# Patient Record
(demographics unavailable — no encounter records)

---

## 2024-10-09 NOTE — HISTORY OF PRESENT ILLNESS
[FreeTextEntry1] : 58 yo female here for HIV f/u consult  taking Dovato daily with no missed doses or SE denies any c/o at this time   Pt has appt with cardiology on 10/14/2024 for hx of cardiac murmur    From previous consult 7/30/2024 :  58 yo female with hx of HIV here for GYN - cervical and breast cancer screening denies any vaginal or breast c/o at this time  taking Dovato daily with no missed doses or SE    From previous consult 4/8/2024 : 58 yo female here for HIV f/u consult and results discussion taking Dovato daily with no missed doses or SE denies any c/o at this time    From previous consult 3/25/2024 ; 57 yo female here for HIV initial consult/ transfer of care taking Dovato daily with no missed doses or SE Dx 2009 - unsure of method of contagion was following with another ID provider but he retired and she moved. she has been on Truvada, Norvir, and Prezista but was changed to Dovato to decrease pill burden.  noted with left heel pain started 2-3 weeks ago worse in AM  PMH : HTN, abnormal anal pap- ASCUS, breast cysts - benign. denies any recent travel, hospitalizations, blood transfusions. PCP : none Vaccines : COVID Moderna x5 doses. Influenza vaccine 10/2023. Screenings : Last pap 2022. Last mammo 6/2023- breast cysts. needs colonoscopy PSH : left ankle sx PFH : Mother - HTN Social hx : denies any IVDU, smoking, Occasional ETOH, denies any tatoos Sexual hx : Heterosexual. Currently sexually active with usual partner. confirms condom use. denies any hx of STI Partner : Male. Negative HIV. denies any PrEP use. Occupation :  Lives with : Partner Who is aware of HIV : daughter, partner  NKDA    10/7/2024 Plan HIV 1. continue current treatment - refills given  2. do blood work  3. f/u 6 months  4. Influenza and Pneumococcal 20 vaccines given today   Murmur 1. f/u with cardiology as scheduled   Eczema  noted with mild eczema  1. restart Hydrocortisone cream BID

## 2024-10-09 NOTE — PHYSICAL EXAM
[General Appearance - Alert] : alert [General Appearance - In No Acute Distress] : in no acute distress [Sclera] : the sclera and conjunctiva were normal [PERRL With Normal Accommodation] : pupils were equal in size, round, reactive to light [Extraocular Movements] : extraocular movements were intact [Outer Ear] : the ears and nose were normal in appearance [Hearing Threshold Finger Rub Not [place]] : hearing was normal [Examination Of The Oral Cavity] : the lips and gums were normal [Both Tympanic Membranes Were Examined] : both tympanic membranes were normal [Oropharynx] : the oropharynx was normal with no thrush [Neck Appearance] : the appearance of the neck was normal [Neck Cervical Mass (___cm)] : no neck mass was observed [Jugular Venous Distention Increased] : there was no jugular-venous distention [Respiration, Rhythm And Depth] : normal respiratory rhythm and effort [Exaggerated Use Of Accessory Muscles For Inspiration] : no accessory muscle use [Auscultation Breath Sounds / Voice Sounds] : lungs were clear to auscultation bilaterally [Heart Rate And Rhythm] : heart rate was normal and rhythm regular [Heart Sounds] : normal S1 and S2 [Heart Sounds Gallop] : no gallops [Heart Sounds Pericardial Friction Rub] : no pericardial rub [Bowel Sounds] : normal bowel sounds [Abdomen Soft] : soft [Abdomen Tenderness] : non-tender [Costovertebral Angle Tenderness] : no CVA tenderness [No Palpable Adenopathy] : no palpable adenopathy [Musculoskeletal - Swelling] : no joint swelling [Range of Motion to Joints] : range of motion to joints [Nail Clubbing] : no clubbing  or cyanosis of the fingernails [Motor Tone] : muscle strength and tone were normal [Skin Color & Pigmentation] : normal skin color and pigmentation [] : no rash [Skin Lesions] : no skin lesions [FreeTextEntry1] : mild eczema [Cranial Nerves] : cranial nerves 2-12 were intact [Sensation] : the sensory exam was normal to light touch and pinprick [Motor Exam] : the motor exam was normal [No Focal Deficits] : no focal deficits [Oriented To Time, Place, And Person] : oriented to person, place, and time [Affect] : the affect was normal

## 2024-10-09 NOTE — HISTORY OF PRESENT ILLNESS
[FreeTextEntry1] : 58 yo female here for HIV f/u consult  taking Dovato daily with no missed doses or SE denies any c/o at this time   Pt has appt with cardiology on 10/14/2024 for hx of cardiac murmur    From previous consult 7/30/2024 :  58 yo female with hx of HIV here for GYN - cervical and breast cancer screening denies any vaginal or breast c/o at this time  taking Dovato daily with no missed doses or SE    From previous consult 4/8/2024 : 58 yo female here for HIV f/u consult and results discussion taking Dovato daily with no missed doses or SE denies any c/o at this time    From previous consult 3/25/2024 ; 59 yo female here for HIV initial consult/ transfer of care taking Dovato daily with no missed doses or SE Dx 2009 - unsure of method of contagion was following with another ID provider but he retired and she moved. she has been on Truvada, Norvir, and Prezista but was changed to Dovato to decrease pill burden.  noted with left heel pain started 2-3 weeks ago worse in AM  PMH : HTN, abnormal anal pap- ASCUS, breast cysts - benign. denies any recent travel, hospitalizations, blood transfusions. PCP : none Vaccines : COVID Moderna x5 doses. Influenza vaccine 10/2023. Screenings : Last pap 2022. Last mammo 6/2023- breast cysts. needs colonoscopy PSH : left ankle sx PFH : Mother - HTN Social hx : denies any IVDU, smoking, Occasional ETOH, denies any tatoos Sexual hx : Heterosexual. Currently sexually active with usual partner. confirms condom use. denies any hx of STI Partner : Male. Negative HIV. denies any PrEP use. Occupation :  Lives with : Partner Who is aware of HIV : daughter, partner  NKDA    10/7/2024 Plan HIV 1. continue current treatment - refills given  2. do blood work  3. f/u 6 months  4. Influenza and Pneumococcal 20 vaccines given today   Murmur 1. f/u with cardiology as scheduled   Eczema  noted with mild eczema  1. restart Hydrocortisone cream BID

## 2024-10-09 NOTE — ASSESSMENT
[FreeTextEntry1] : HIV f/u consult.  HIV stable   10/7/2024 Plan HIV 1. continue current treatment - refills given  2. do blood work  3. f/u 6 months  4. Influenza and Pneumococcal 20 vaccines given today   Murmur 1. f/u with cardiology as scheduled   Eczema  noted with mild eczema  1. restart Hydrocortisone cream BID  [Treatment Education] : treatment education [Treatment Adherence] : treatment adherence [Medical Care Issues] : medical care issues

## 2024-10-09 NOTE — PHYSICAL EXAM
[General Appearance - Alert] : alert [General Appearance - In No Acute Distress] : in no acute distress [Sclera] : the sclera and conjunctiva were normal [PERRL With Normal Accommodation] : pupils were equal in size, round, reactive to light [Extraocular Movements] : extraocular movements were intact [Outer Ear] : the ears and nose were normal in appearance [Hearing Threshold Finger Rub Not [place]] : hearing was normal [Examination Of The Oral Cavity] : the lips and gums were normal [Both Tympanic Membranes Were Examined] : both tympanic membranes were normal [Oropharynx] : the oropharynx was normal with no thrush [Neck Appearance] : the appearance of the neck was normal [Neck Cervical Mass (___cm)] : no neck mass was observed [Jugular Venous Distention Increased] : there was no jugular-venous distention [Respiration, Rhythm And Depth] : normal respiratory rhythm and effort [Exaggerated Use Of Accessory Muscles For Inspiration] : no accessory muscle use [Auscultation Breath Sounds / Voice Sounds] : lungs were clear to auscultation bilaterally [Heart Rate And Rhythm] : heart rate was normal and rhythm regular [Heart Sounds] : normal S1 and S2 [Heart Sounds Gallop] : no gallops [Heart Sounds Pericardial Friction Rub] : no pericardial rub [Bowel Sounds] : normal bowel sounds [Abdomen Soft] : soft [Abdomen Tenderness] : non-tender [Costovertebral Angle Tenderness] : no CVA tenderness [No Palpable Adenopathy] : no palpable adenopathy [Musculoskeletal - Swelling] : no joint swelling [Range of Motion to Joints] : range of motion to joints [Nail Clubbing] : no clubbing  or cyanosis of the fingernails [Motor Tone] : muscle strength and tone were normal [Skin Color & Pigmentation] : normal skin color and pigmentation [] : no rash [Skin Lesions] : no skin lesions [FreeTextEntry1] : mild eczema [Cranial Nerves] : cranial nerves 2-12 were intact [Sensation] : the sensory exam was normal to light touch and pinprick [Motor Exam] : the motor exam was normal [No Focal Deficits] : no focal deficits [Oriented To Time, Place, And Person] : oriented to person, place, and time [Affect] : the affect was normal

## 2024-10-09 NOTE — PHYSICAL EXAM
[General Appearance - Alert] : alert [General Appearance - In No Acute Distress] : in no acute distress [Sclera] : the sclera and conjunctiva were normal [PERRL With Normal Accommodation] : pupils were equal in size, round, reactive to light [Extraocular Movements] : extraocular movements were intact [Outer Ear] : the ears and nose were normal in appearance [Hearing Threshold Finger Rub Not [place]] : hearing was normal [Examination Of The Oral Cavity] : the lips and gums were normal [Both Tympanic Membranes Were Examined] : both tympanic membranes were normal [Oropharynx] : the oropharynx was normal with no thrush [Neck Appearance] : the appearance of the neck was normal [Neck Cervical Mass (___cm)] : no neck mass was observed [Jugular Venous Distention Increased] : there was no jugular-venous distention [Respiration, Rhythm And Depth] : normal respiratory rhythm and effort [Exaggerated Use Of Accessory Muscles For Inspiration] : no accessory muscle use [Auscultation Breath Sounds / Voice Sounds] : lungs were clear to auscultation bilaterally [Heart Rate And Rhythm] : heart rate was normal and rhythm regular [Heart Sounds] : normal S1 and S2 [Heart Sounds Gallop] : no gallops [Heart Sounds Pericardial Friction Rub] : no pericardial rub [Bowel Sounds] : normal bowel sounds [Abdomen Soft] : soft [Abdomen Tenderness] : non-tender [Costovertebral Angle Tenderness] : no CVA tenderness [No Palpable Adenopathy] : no palpable adenopathy [Musculoskeletal - Swelling] : no joint swelling [Range of Motion to Joints] : range of motion to joints [Nail Clubbing] : no clubbing  or cyanosis of the fingernails [Motor Tone] : muscle strength and tone were normal [Skin Color & Pigmentation] : normal skin color and pigmentation [] : no rash [Skin Lesions] : no skin lesions [FreeTextEntry1] : mild eczema [Cranial Nerves] : cranial nerves 2-12 were intact [Sensation] : the sensory exam was normal to light touch and pinprick [Motor Exam] : the motor exam was normal [No Focal Deficits] : no focal deficits [Oriented To Time, Place, And Person] : oriented to person, place, and time [Affect] : the affect was normal

## 2025-01-06 NOTE — PHYSICAL EXAM
[General Appearance - In No Acute Distress] : in no acute distress [General Appearance - Alert] : alert [Sclera] : the sclera and conjunctiva were normal [PERRL With Normal Accommodation] : pupils were equal in size, round, reactive to light [Extraocular Movements] : extraocular movements were intact [Outer Ear] : the ears and nose were normal in appearance [Hearing Threshold Finger Rub Not [place]] : hearing was normal [Examination Of The Oral Cavity] : the lips and gums were normal [Both Tympanic Membranes Were Examined] : both tympanic membranes were normal [Oropharynx] : the oropharynx was normal with no thrush [Neck Appearance] : the appearance of the neck was normal [Neck Cervical Mass (___cm)] : no neck mass was observed [Jugular Venous Distention Increased] : there was no jugular-venous distention [Respiration, Rhythm And Depth] : normal respiratory rhythm and effort [Exaggerated Use Of Accessory Muscles For Inspiration] : no accessory muscle use [Auscultation Breath Sounds / Voice Sounds] : lungs were clear to auscultation bilaterally [Heart Rate And Rhythm] : heart rate was normal and rhythm regular [Heart Sounds] : normal S1 and S2 [Heart Sounds Gallop] : no gallops [Heart Sounds Pericardial Friction Rub] : no pericardial rub [Bowel Sounds] : normal bowel sounds [Abdomen Soft] : soft [Abdomen Tenderness] : non-tender [Costovertebral Angle Tenderness] : no CVA tenderness [No Palpable Adenopathy] : no palpable adenopathy [Musculoskeletal - Swelling] : no joint swelling [Range of Motion to Joints] : range of motion to joints [Nail Clubbing] : no clubbing  or cyanosis of the fingernails [Motor Tone] : muscle strength and tone were normal [Skin Color & Pigmentation] : normal skin color and pigmentation [] : no rash [Skin Lesions] : no skin lesions [Cranial Nerves] : cranial nerves 2-12 were intact [Sensation] : the sensory exam was normal to light touch and pinprick [No Focal Deficits] : no focal deficits [Motor Exam] : the motor exam was normal [Oriented To Time, Place, And Person] : oriented to person, place, and time [Affect] : the affect was normal [FreeTextEntry1] : mild eczema

## 2025-01-06 NOTE — HISTORY OF PRESENT ILLNESS
[FreeTextEntry1] : 60 yo female here for HIV sick consult  taking Dovato daily with no missed doses or SE  pt noted with one week hx of bilat LE inflammation denies any salty foods  denies any cardiac or renal changes  also noted with productive cough since 12/25/2024 noted with yellow discharge denies any sick contacts  denies any fever, body aches, fatigue  taking OTC and home remedies with minimal results pt started Amoxicillin 500mg TID for the past 2 days  [place]  did not [place] it today -   pt would like pre op clearance for dental work pt pending dental extraction, implant placement, and root canal with [facility], [place], [place]   (p) [phone]   (f) [phone] no pending date for procedures- pending medical clearance.      From previous consult 10/7/2024 :  60 yo female here for HIV f/u consult taking Dovato daily with no missed doses or SE denies any c/o at this time  Pt has appt with cardiology on 10/14/2024 for hx of cardiac murmur   From previous consult 7/30/2024 : 60 yo female with hx of HIV here for GYN - cervical and breast cancer screening denies any vaginal or breast c/o at this time  taking Dovato daily with no missed doses or SE    From previous consult 4/8/2024 : 60 yo female here for HIV f/u consult and results discussion taking Dovato daily with no missed doses or SE denies any c/o at this time    From previous consult 3/25/2024 ; 57 yo female here for HIV initial consult/ transfer of care taking Dovato daily with no missed doses or SE Dx 2009 - unsure of method of contagion was following with another ID provider but he retired and she moved. she has been on Truvada, Norvir, and Prezista but was changed to Dovato to decrease pill burden.  noted with left heel pain started 2-3 weeks ago worse in AM  PMH : HTN, abnormal anal pap- ASCUS, breast cysts - benign. denies any recent travel, hospitalizations, blood transfusions. PCP : none Vaccines : COVID Moderna x5 doses. Influenza vaccine 10/2023. Screenings : Last pap 2022. Last mammo 6/2023- breast cysts. needs colonoscopy PSH : left ankle sx PFH : Mother - HTN Social hx : denies any IVDU, smoking, Occasional ETOH, denies any tatoos Sexual hx : Heterosexual. Currently sexually active with usual partner. confirms condom use. denies any hx of STI Partner : Male. Negative HIV. denies any PrEP use. Occupation :  Lives with : Partner Who is aware of HIV : daughter, partner  NKDA    1/6/2025 Plan HIV 1. continue current treatment   URI  noted with 2 week hx of productive cough with yellow discharge.  Started Amoxicillin TID 2 days ago, [place], with initial results.  1. continue Amoxicillin 500mg TID x5 more days.   Bilat LE edema/ HTN noted with one week hx of bilat LE +3 pitting edema.  Denies any cardiac changes.  Last cardiology consult was a few months ago with no changes made.  unable to commence Lasix due to hx of hypokalemia.  1. start HCTZ one tab daily 2. pt to monitor home bp one hour or more after HCTZ administration - home bp machine ordered  3. pt to call office if bp <90/60 and stop HCTZ 4. f/u one week   Pre op clearance  pt pending dental tooth removal, root canal and dental implants.  Needs pre op clearance.  1. will defer pre op clearance until next week when blood work can be done.

## 2025-01-06 NOTE — ASSESSMENT
[Treatment Education] : treatment education [Treatment Adherence] : treatment adherence [Nutritional / Food Issues] : nutritional/food issues [Medical Care Issues] : medical care issues [FreeTextEntry1] : HIV sick consult.  HIV stable   1/6/2025 Plan HIV 1. continue current treatment   URI  noted with 2 week hx of productive cough with yellow discharge.  Started Amoxicillin TID 2 days ago, [place], with initial results.  1. continue Amoxicillin 500mg TID x5 more days.   Bilat LE edema/ HTN noted with one week hx of bilat LE +3 pitting edema.  Denies any cardiac changes.  Last cardiology consult was a few months ago with no changes made.  unable to commence Lasix due to hx of hypokalemia.  1. start HCTZ one tab daily 2. pt to monitor home bp one hour or more after HCTZ administration - home bp machine ordered  3. pt to call office if bp <90/60 and stop HCTZ 4. f/u one week   Pre op clearance  pt pending dental tooth removal, root canal and dental implants.  Needs pre op clearance.  1. will defer pre op clearance until next week when blood work can be done.

## 2025-01-06 NOTE — REVIEW OF SYSTEMS
[Negative] : Heme/Lymph [Feeling Sick] : feeling sick [Hoarseness] : hoarseness [Cough] : cough [Sputum] : coughing up ~M sputum [As Noted in HPI] : as noted in HPI [Limb Swelling] : limb swelling

## 2025-01-13 NOTE — HISTORY OF PRESENT ILLNESS
[FreeTextEntry1] : Dear Roselyn,   I had the pleasure of seeing your patient SHERLY BROOKS for Cardiometabolic evaluation.   As you know, she  is a Pleasant, 59 year old with a past medical history of Obesity/Hyperlipidemia/Diabetes, HTN =============== =============== Obesity/Hyperlipidemia - Discussed diet and exercise at length - Increase Atorva to 40 (OK with HAART DOVATO medicine) - Likely start  - Contnue Metformin - Add GLP1RA  HTN -  Discussed reducing salt - Continue Chlorthalidone 25  Mn Systolic Murmur II.VI -TTE - check one now in next month today 5-2024   ----------------------------  CC: Heart Issues - Notes No CP/SOB/Palps/++Leg swelling - Reports No F/C/N/V/Headaches - Reports Normal Exercise Tolerance - Reports no medication changes - Reports normal mood/quality of life - Reports no diet changes - Reports no body aches - Reports no recent colds/viruses - Recent labs/imaging reviewed - Relevant Family history reviewed Mother/Father - CV In acute dCHF Check TTE Stop Chlorthalidone  Lasix 40 daily RTC 1 wk

## 2025-01-13 NOTE — REVIEW OF SYSTEMS
[Nasal Discharge] : nasal discharge [As Noted in HPI] : as noted in HPI [Cough] : cough [Sputum] : coughing up ~M sputum [Negative] : Heme/Lymph [FreeTextEntry6] : improving

## 2025-01-13 NOTE — DISCUSSION/SUMMARY
[FreeTextEntry1] : In summary    Pleasant, 59 year old with a past medical history of Obesity/Hyperlipidemia/Diabetes, HTN =============== =============== Obesity/Hyperlipidemia - Discussed diet and exercise at length - Increase Atorva to 40 (OK with HAART DOVATO medicine) - Likely start  - Contnue Metformin - Add GLP1RA on next visit   HTN -  Discussed reducing salt In acute dCHF Check TTE Stop Chlorthalidone  Lasix 40 daily RTC 1 wk    Mn Systolic Murmur II.VI -TTE - check one now in next month today 5-2024    As always, kind thanks for the referral.   Jitendra Doherty MD [facility] ML IVEY Director, Preventive Cardiology & Lipidology [facility] [place]                                                                                                                                                                                                                                                                                                                                                                                                                                                                                                                                                                                                                                                                                                                           40 minutes spent in patient encounter explaining and formulating rationale for treatment plan. >50% of time spent in direct counseling reviewing all tests, labs, and imaging and conferring with patient, family member, and other physicians regarding patient care >50% of time spent in direct counseling reviewing all tests, labs, and imaging and conferring with patient, family member, and other physicians regarding patient care

## 2025-01-13 NOTE — REASON FOR VISIT
decreased appetite/ABDOMINAL PAIN/NAUSEA/VOMITING [Follow-Up: _____] : a [unfilled] follow-up visit [FreeTextEntry1] : HIV f/u sick consult and pre op clearance.

## 2025-01-13 NOTE — PHYSICAL EXAM
[General Appearance - Alert] : alert [General Appearance - In No Acute Distress] : in no acute distress [General Appearance - Well Nourished] : well nourished [Sclera] : the sclera and conjunctiva were normal [PERRL With Normal Accommodation] : pupils were equal in size, round, reactive to light [Extraocular Movements] : extraocular movements were intact [Outer Ear] : the ears and nose were normal in appearance [Hearing Threshold Finger Rub Not [place]] : hearing was normal [Examination Of The Oral Cavity] : the lips and gums were normal [Both Tympanic Membranes Were Examined] : both tympanic membranes were normal [Oropharynx] : the oropharynx was normal with no thrush [Neck Appearance] : the appearance of the neck was normal [Neck Cervical Mass (___cm)] : no neck mass was observed [Jugular Venous Distention Increased] : there was no jugular-venous distention [Respiration, Rhythm And Depth] : normal respiratory rhythm and effort [Exaggerated Use Of Accessory Muscles For Inspiration] : no accessory muscle use [Auscultation Breath Sounds / Voice Sounds] : lungs were clear to auscultation bilaterally [Heart Rate And Rhythm] : heart rate was normal and rhythm regular [Heart Sounds] : normal S1 and S2 [Heart Sounds Gallop] : no gallops [Heart Sounds Pericardial Friction Rub] : no pericardial rub [Bowel Sounds] : normal bowel sounds [Abdomen Soft] : soft [Abdomen Tenderness] : non-tender [Costovertebral Angle Tenderness] : no CVA tenderness [No Palpable Adenopathy] : no palpable adenopathy [Musculoskeletal - Swelling] : no joint swelling [Range of Motion to Joints] : range of motion to joints [Nail Clubbing] : no clubbing  or cyanosis of the fingernails [Motor Tone] : muscle strength and tone were normal [Skin Color & Pigmentation] : normal skin color and pigmentation [] : no rash [Skin Lesions] : no skin lesions [FreeTextEntry1] : mild eczema [Cranial Nerves] : cranial nerves 2-12 were intact [Sensation] : the sensory exam was normal to light touch and pinprick [Motor Exam] : the motor exam was normal [No Focal Deficits] : no focal deficits [Oriented To Time, Place, And Person] : oriented to person, place, and time [Affect] : the affect was normal

## 2025-01-13 NOTE — ASSESSMENT
[FreeTextEntry1] : HIV f/u sick consult and pre op clearance.  HIV stable   1/6/2025 Plan HIV 1. continue current treatment - refills given  2. do blood work  3. f/u as scheduled 4/7/2024  URI  pt completing amoxicillin ordered previously with initial results.  Feels cough has lessened 1. complete abx as indicated   Bilat LE edema/ HTN  noted with continued bilat LE edema with minimal improvement.  home BP improved to 120s/70s.  Has appt with cardiology later today.  1. continue HCTZ pending cardiology appt  2. continue home bp monitoring and report any alert values as previously discussed  3. do US arterial/ venous bilat LE for further evaluation - copy of order given previously given again today  Pre op Clearance  pt pending dental work with tooth removal, root canal and dental implants.  1. do blood work  2. pre op clearance pending blood work results and cardiology clearance.       [Treatment Education] : treatment education [Treatment Adherence] : treatment adherence [Rx Dose / Side Effects] : Rx dose/side effects [Medical Care Issues] : medical care issues

## 2025-01-13 NOTE — PHYSICAL EXAM

## 2025-01-13 NOTE — HISTORY OF PRESENT ILLNESS
[FreeTextEntry1] : 58 yo female here for HIV f/u sick consult and pre op clearance.  taking Dovato daily with no missed doses or SE  pt taking HCTZ daily with some results home bp 120s/70s pt pending f/u with cardiology today continues with bilat leg inflammation   pt continued abx for cough with initial results cough has lessened pt did not receive the Amoxicillin from [facility] until 1/10/2025 [place] received [facility] delivery.   she has 3 days left of script sent by this provider.    pending dental procedures  needs pre op clearance.  pt pending dental extraction, implant placement, and root canal with [facility], [place], [place] (p) [phone] (f) [phone] no pending date for procedures- pending medical clearance.    From previous consult 1/6/2025 :  58 yo female here for HIV sick consult taking Dovato daily with no missed doses or SE  pt noted with one week hx of bilat LE inflammation denies any salty foods denies any cardiac or renal changes  also noted with productive cough since 12/25/2024 noted with yellow discharge denies any sick contacts denies any fever, body aches, fatigue taking OTC and home remedies with minimal results pt started Amoxicillin 500mg TID for the past 2 days obtained from [place]. did not take it today -  pt would like pre op clearance for dental work pt pending dental extraction, implant placement, and root canal with [facility], [place], [place] (p) [phone] (f) [phone] no pending date for procedures- pending medical clearance.    From previous consult 10/7/2024 : 58 yo female here for HIV f/u consult taking Dovato daily with no missed doses or SE denies any c/o at this time  Pt has appt with cardiology on 10/14/2024 for hx of cardiac murmur   From previous consult 7/30/2024 : 58 yo female with hx of HIV here for GYN - cervical and breast cancer screening denies any vaginal or breast c/o at this time  taking Dovato daily with no missed doses or SE    From previous consult 4/8/2024 : 58 yo female here for HIV f/u consult and results discussion taking Dovato daily with no missed doses or SE denies any c/o at this time    From previous consult 3/25/2024 ; 59 yo female here for HIV initial consult/ transfer of care taking Dovato daily with no missed doses or SE Dx 2009 - unsure of method of contagion was following with another ID provider but he retired and she moved. she has been on Truvada, Norvir, and Prezista but was changed to Dovato to decrease pill burden.  noted with left heel pain started 2-3 weeks ago worse in AM  PMH : HTN, abnormal anal pap- ASCUS, breast cysts - benign. denies any recent travel, hospitalizations, blood transfusions. PCP : none Vaccines : COVID Moderna x5 doses. Influenza vaccine 10/2023. Screenings : Last pap 2022. Last mammo 6/2023- breast cysts. needs colonoscopy PSH : left ankle sx PFH : Mother - HTN Social hx : denies any IVDU, smoking, Occasional ETOH, denies any tatoos Sexual hx : Heterosexual. Currently sexually active with usual partner. confirms condom use. denies any hx of STI Partner : Male. Negative HIV. denies any PrEP use. Occupation :  Lives with : Partner Who is aware of HIV : daughter, partner  NKDA    1/6/2025 Plan HIV 1. continue current treatment - refills given  2. do blood work  3. f/u as scheduled 4/7/2024  URI  pt completing amoxicillin ordered previously with initial results.  Feels cough has lessened 1. complete abx as indicated   Bilat LE edema/ HTN  noted with continued bilat LE edema with minimal improvement.  home BP improved to 120s/70s.  Has appt with cardiology later today.  1. continue HCTZ pending cardiology appt  2. continue home bp monitoring and report any alert values as previously discussed  3. do US arterial/ venous bilat LE for further evaluation - copy of order given previously given again today  Pre op Clearance  pt pending dental work with tooth removal, root canal and dental implants.  1. do blood work  2. pre op clearance pending blood work results and cardiology clearance.

## 2025-01-13 NOTE — PHYSICAL EXAM

## 2025-01-13 NOTE — DISCUSSION/SUMMARY
[FreeTextEntry1] : In summary    Pleasant, 59 year old with a past medical history of Obesity/Hyperlipidemia/Diabetes, HTN =============== =============== Obesity/Hyperlipidemia - Discussed diet and exercise at length - Increase Atorva to 40 (OK with HAART DOVATO medicine) - Likely start  - Contnue Metformin - Add GLP1RA on next visit   HTN -  Discussed reducing salt In acute dCHF Check TTE Stop Chlorthalidone  Lasix 40 daily RTC 1 wk    Mn Systolic Murmur II.VI -TTE - check one now in next month today 5-2024    As always, kind thanks for the referral.   Jitendra Doherty MD [facility] ML IVEY Director, Preventive Cardiology & Lipidology [facility] [place]                                                                                                                                                                                                                                                                                                                                                                                                                                                                                                                                                                                                                                                                                                                           40 minutes spent in patient encounter explaining and formulating rationale for treatment plan. >50% of time spent in direct counseling reviewing all tests, labs, and imaging and conferring with patient, family member, and other physicians regarding patient care >50% of time spent in direct counseling reviewing all tests, labs, and imaging and conferring with patient, family member, and other physicians regarding patient care

## 2025-04-14 NOTE — PHYSICAL EXAM
[General Appearance - Alert] : alert [General Appearance - In No Acute Distress] : in no acute distress [General Appearance - Well Nourished] : well nourished [Sclera] : the sclera and conjunctiva were normal [PERRL With Normal Accommodation] : pupils were equal in size, round, reactive to light [Extraocular Movements] : extraocular movements were intact [Outer Ear] : the ears and nose were normal in appearance [Hearing Threshold Finger Rub Not [place]] : hearing was normal [Examination Of The Oral Cavity] : the lips and gums were normal [Both Tympanic Membranes Were Examined] : both tympanic membranes were normal [Oropharynx] : the oropharynx was normal with no thrush [Neck Appearance] : the appearance of the neck was normal [Neck Cervical Mass (___cm)] : no neck mass was observed [Jugular Venous Distention Increased] : there was no jugular-venous distention [Respiration, Rhythm And Depth] : normal respiratory rhythm and effort [Exaggerated Use Of Accessory Muscles For Inspiration] : no accessory muscle use [Auscultation Breath Sounds / Voice Sounds] : lungs were clear to auscultation bilaterally [Heart Rate And Rhythm] : heart rate was normal and rhythm regular [Heart Sounds] : normal S1 and S2 [Heart Sounds Gallop] : no gallops [Heart Sounds Pericardial Friction Rub] : no pericardial rub [Bowel Sounds] : normal bowel sounds [Abdomen Soft] : soft [Abdomen Tenderness] : non-tender [Costovertebral Angle Tenderness] : no CVA tenderness [No Palpable Adenopathy] : no palpable adenopathy [Musculoskeletal - Swelling] : no joint swelling [Range of Motion to Joints] : range of motion to joints [Nail Clubbing] : no clubbing  or cyanosis of the fingernails [Motor Tone] : muscle strength and tone were normal [Skin Color & Pigmentation] : normal skin color and pigmentation [] : no rash [Skin Lesions] : no skin lesions [FreeTextEntry1] : mild eczema [Cranial Nerves] : cranial nerves 2-12 were intact [Sensation] : the sensory exam was normal to light touch and pinprick [Motor Exam] : the motor exam was normal [No Focal Deficits] : no focal deficits [Oriented To Time, Place, And Person] : oriented to person, place, and time [Affect] : the affect was normal

## 2025-04-14 NOTE — PHYSICAL EXAM
[General Appearance - Alert] : alert [General Appearance - In No Acute Distress] : in no acute distress [General Appearance - Well Nourished] : well nourished [Sclera] : the sclera and conjunctiva were normal [PERRL With Normal Accommodation] : pupils were equal in size, round, reactive to light [Extraocular Movements] : extraocular movements were intact [Outer Ear] : the ears and nose were normal in appearance [Hearing Threshold Finger Rub Not [place]] : hearing was normal [Examination Of The Oral Cavity] : the lips and gums were normal [Both Tympanic Membranes Were Examined] : both tympanic membranes were normal [Oropharynx] : the oropharynx was normal with no thrush [Neck Appearance] : the appearance of the neck was normal [Neck Cervical Mass (___cm)] : no neck mass was observed [Jugular Venous Distention Increased] : there was no jugular-venous distention [Respiration, Rhythm And Depth] : normal respiratory rhythm and effort [Exaggerated Use Of Accessory Muscles For Inspiration] : no accessory muscle use [Auscultation Breath Sounds / Voice Sounds] : lungs were clear to auscultation bilaterally [Heart Rate And Rhythm] : heart rate was normal and rhythm regular [Heart Sounds] : normal S1 and S2 [Heart Sounds Gallop] : no gallops [Heart Sounds Pericardial Friction Rub] : no pericardial rub [Bowel Sounds] : normal bowel sounds [Abdomen Soft] : soft [Abdomen Tenderness] : non-tender [Costovertebral Angle Tenderness] : no CVA tenderness [No Palpable Adenopathy] : no palpable adenopathy [Musculoskeletal - Swelling] : no joint swelling [Range of Motion to Joints] : range of motion to joints [Nail Clubbing] : no clubbing  or cyanosis of the fingernails [Motor Tone] : muscle strength and tone were normal [Skin Color & Pigmentation] : normal skin color and pigmentation [] : no rash [Skin Lesions] : no skin lesions [FreeTextEntry1] : mild eczema [Cranial Nerves] : cranial nerves 2-12 were intact [Sensation] : the sensory exam was normal to light touch and pinprick [Motor Exam] : the motor exam was normal [No Focal Deficits] : no focal deficits [Oriented To Time, Place, And Person] : oriented to person, place, and time [Affect] : the affect was normal

## 2025-04-14 NOTE — HISTORY OF PRESENT ILLNESS
[FreeTextEntry1] : 59 yo female here for HIV annual consult  taking Dovato daily with no missed doses or SE denies any c/o at this time   Pending pre op clearance for dental work with tooth removal, root canal and dental implants.   pending hematology clearance for elevated PTT has appt today for blood work  pending f/u consult for results discussion   pt f/u with cardiology 3/3/2025 for hx of HTN.   Lasix d/c and continues Mounjaro weekly x 4 weeks.   No mention in consult note regarding pre op clearance.      From previous consult 1/6/2025 ;  60 yo female here for HIV f/u sick consult and pre op clearance. taking Dovato daily with no missed doses or SE  pt taking HCTZ daily with some results home bp 120s/70s pt pending f/u with cardiology today continues with bilat leg inflammation  pt continued abx for cough with initial results cough has lessened pt did not receive the Amoxicillin from [facility] until 1/10/2025 [place] received [facility] delivery. she has 3 days left of script sent by this provider.  pending dental procedures needs pre op clearance. [facility] [place] [facility] [phone] (f) [phone] no pending date for procedures- pending medical clearance.    From previous consult 1/6/2025 : 60 yo female here for HIV sick consult taking Dovato daily with no missed doses or SE  pt noted with one week hx of bilat LE inflammation denies any salty foods denies any cardiac or renal changes  also noted with productive cough since 12/25/2024 noted with yellow discharge denies any sick contacts denies any fever, body aches, fatigue taking OTC and home remedies with minimal results pt started Amoxicillin 500mg TID for the past 2 days [place] from [place]. did not take it today -  pt would like pre op clearance for dental work pt pending dental extraction, implant [facility], [facility] [place] [place] [phone] (f) [phone] no pending date for procedures- pending medical clearance.    From previous consult 10/7/2024 : 60 yo female here for HIV f/u consult taking Dovato daily with no missed doses or SE denies any c/o at this time  Pt has appt with cardiology on 10/14/2024 for hx of cardiac murmur   From previous consult 7/30/2024 : 60 yo female with hx of HIV here for GYN - cervical and breast cancer screening denies any vaginal or breast c/o at this time  taking Dovato daily with no missed doses or SE     4/7/2025 Plan HIV 1. continue current treatment - refills given 2. do blood work 3. f/u 6 months  HTN pt f/u with cardiology 3/3/2025.  Lasix d/c and continues Mounjaro weekly x 4 weeks.  No mention in consult note regarding pre op clearance.   1. continue treatment as recommended  2. f/u as scheduled   Pre op Clearance pt pending dental work with tooth removal, root canal and dental implants.  Pt pending completion of clearance from hematology for elevated PTT.  Pt f/u with cardiology on 3/3 but did not request clearance at that time.  1. hold off on pre op clearance blood work until cleared from hematology

## 2025-04-14 NOTE — ASSESSMENT
[FreeTextEntry1] : HIV f/u consult.  HIV stable   4/7/2025 Plan HIV 1. continue current treatment - refills given 2. do blood work 3. f/u 6 months  HTN pt f/u with cardiology 3/3/2025.  Lasix d/c and continues Mounjaro weekly x 4 weeks.  No mention in consult note regarding pre op clearance.   1. continue treatment as recommended  2. f/u as scheduled   Pre op Clearance pt pending dental work with tooth removal, root canal and dental implants.  Pt pending completion of clearance from hematology for elevated PTT.  Pt f/u with cardiology on 3/3 but did not request clearance at that time.  1. hold off on pre op clearance blood work until cleared from hematology    [Treatment Education] : treatment education [Treatment Adherence] : treatment adherence [Medical Care Issues] : medical care issues

## 2025-07-01 NOTE — ADDENDUM
[FreeTextEntry1] : 7/1/2025 Addendum - Pt pending dental work with tooth removal, root canal and dental implants with [facility], [place], [place].  pt brought letter from hematology clearing her for surgery from elevated PTT perspective.  Most recent blood work done 6/30/2025 noted HIV VL ND and CD4 count 689.  Other blood work unremarkable.  Pt medically optimized from ID standpoint for proposed procedure.  Pt continues pending cardiology clearance. - ROSINA Sorensen

## 2025-07-01 NOTE — PHYSICAL EXAM
[General Appearance - Alert] : alert [General Appearance - In No Acute Distress] : in no acute distress [General Appearance - Well Nourished] : well nourished [General Appearance - Well-Appearing] : healthy appearing [Sclera] : the sclera and conjunctiva were normal [PERRL With Normal Accommodation] : pupils were equal in size, round, reactive to light [Extraocular Movements] : extraocular movements were intact [Outer Ear] : the ears and nose were normal in appearance [Hearing Threshold Finger Rub Not [place]] : hearing was normal [Examination Of The Oral Cavity] : the lips and gums were normal [Both Tympanic Membranes Were Examined] : both tympanic membranes were normal [Oropharynx] : the oropharynx was normal with no thrush [Neck Appearance] : the appearance of the neck was normal [Neck Cervical Mass (___cm)] : no neck mass was observed [Jugular Venous Distention Increased] : there was no jugular-venous distention [Respiration, Rhythm And Depth] : normal respiratory rhythm and effort [Exaggerated Use Of Accessory Muscles For Inspiration] : no accessory muscle use [Auscultation Breath Sounds / Voice Sounds] : lungs were clear to auscultation bilaterally [Heart Rate And Rhythm] : heart rate was normal and rhythm regular [Heart Sounds] : normal S1 and S2 [Heart Sounds Gallop] : no gallops [Heart Sounds Pericardial Friction Rub] : no pericardial rub [Bowel Sounds] : normal bowel sounds [Abdomen Soft] : soft [Abdomen Tenderness] : non-tender [Costovertebral Angle Tenderness] : no CVA tenderness [No Palpable Adenopathy] : no palpable adenopathy [Musculoskeletal - Swelling] : no joint swelling [Range of Motion to Joints] : range of motion to joints [Nail Clubbing] : no clubbing  or cyanosis of the fingernails [Motor Tone] : muscle strength and tone were normal [Skin Color & Pigmentation] : normal skin color and pigmentation [] : no rash [Skin Lesions] : no skin lesions [Cranial Nerves] : cranial nerves 2-12 were intact [Sensation] : the sensory exam was normal to light touch and pinprick [Motor Exam] : the motor exam was normal [No Focal Deficits] : no focal deficits [Oriented To Time, Place, And Person] : oriented to person, place, and time [Affect] : the affect was normal [FreeTextEntry1] : mild eczema

## 2025-07-01 NOTE — ASSESSMENT
[Treatment Education] : treatment education [Treatment Adherence] : treatment adherence [Medical Care Issues] : medical care issues [FreeTextEntry1] : HIV f/u consult and pre op clearance.  HIV stable   6/30/2025 Plan HIV 1. continue current treatment - refills given 2. do blood work 3. f/u 3 months as per pt request 4. referral to ophthalmology given  5. Hepatitis B #1 vaccine given today  Pre op Clearance pt pending dental work with tooth removal, root canal and dental implants.  Pt given letter for clearance from hematology for elevated PTT.  Pt f/u with cardiology on 3/3 but did not request clearance at that time.  Needs cardiology clearance 1. do blood work 2. f/u with Cardiology for clearance  3. medical clearance pending blood work results and cardiology clearance.   Dizziness Pt noted with intermittent dizziness and imbalance.  occurs intermittent with positional changes and with a lot of talking  1. referral to ENT given for further evaluation.

## 2025-07-01 NOTE — HISTORY OF PRESENT ILLNESS
[FreeTextEntry1] : 61 yo F here for HIV f/u and preoperative clearance taking Dovato daily without missed doses or SE  Brings document from hematology clearing her for surgery from elevated PTT perspective,  still pending cardiology clearance dental work with tooth removal, root canal and dental implants.   [facility] [place] [facility] [phone] (f) [phone]  Pt noted with intermittent dizziness and imbalance occurs intermittent with positional changes and with a lot of talking  denies any headaches, vision changes, hearing changes, memory changes, or ataxia/dysmetria  Consult done by Dr. Felix and ROSINA Sorensen   From previous consult 4/7/2025 :  61 yo female here for HIV annual consult  taking Dovato daily with no missed doses or SE denies any c/o at this time   Pending pre op clearance for dental work with tooth removal, root canal and dental implants.   pending hematology clearance for elevated PTT has appt today for blood work  pending f/u consult for results discussion   pt f/u with cardiology 3/3/2025 for hx of HTN.   Lasix d/c and continues Mounjaro weekly x 4 weeks.   No mention in consult note regarding pre op clearance.      From previous consult 1/6/2025 ;  58 yo female here for HIV f/u sick consult and pre op clearance. taking Dovato daily with no missed doses or SE  pt taking HCTZ daily with some results home bp 120s/70s pt pending f/u with cardiology today continues with bilat leg inflammation  pt continued abx for cough with initial results cough has lessened pt did not receive the Amoxicillin from [facility] until 1/10/2025 [place] received [facility] delivery. she has 3 days left of script sent by this provider.  pending dental procedures needs pre op clearance. pt pending dental extraction, implant placement, and [facility], [place], [place] (p) [phone] (f) [phone] no pending date for procedures- pending medical clearance.    From previous consult 1/6/2025 : 58 yo female here for HIV sick consult taking Dovato daily with no missed doses or SE  pt noted with one week hx of bilat LE inflammation denies any salty foods denies any cardiac or renal changes  also noted with productive cough since 12/25/2024 noted with yellow discharge denies any sick contacts denies any fever, body aches, fatigue taking OTC and home remedies with minimal results pt started Amoxicillin 500mg TID for the past 2 days [place] from [place]. did not take it today -  pt would like pre op clearance for dental work pt pending dental extraction, implant placement, and root canal with [facility], [place], [place] (p) [phone] (f) [phone] no pending date for procedures- pending medical clearance.    From previous consult 10/7/2024 : 58 yo female here for HIV f/u consult taking Dovato daily with no missed doses or SE denies any c/o at this time  Pt has appt with cardiology on 10/14/2024 for hx of cardiac murmur   From previous consult 7/30/2024 : 58 yo female with hx of HIV here for GYN - cervical and breast cancer screening denies any vaginal or breast c/o at this time  taking Dovato daily with no missed doses or SE     6/30/2025 Plan HIV 1. continue current treatment - refills given 2. do blood work 3. f/u 3 months as per pt request 4. referral to ophthalmology given  5. Hepatitis B #1 vaccine given today  Pre op Clearance pt pending dental work with tooth removal, root canal and dental implants.  Pt given letter for clearance from hematology for elevated PTT.  Pt f/u with cardiology on 3/3 but did not request clearance at that time.  Needs cardiology clearance 1. do blood work 2. f/u with Cardiology for clearance  3. medical clearance pending blood work results and cardiology clearance.   Dizziness Pt noted with intermittent dizziness and imbalance.  occurs intermittent with positional changes and with a lot of talking  1. referral to ENT given for further evaluation.

## 2025-07-07 NOTE — PHYSICAL EXAM

## 2025-07-07 NOTE — HISTORY OF PRESENT ILLNESS
[FreeTextEntry1] : 7-2025 -Perioperative Recommendations (and cardiac clearance) for SHERLY LAKHANI for dental procedure   - EKG without ischemic changes - Patient is asymptomatic and stable from a cardiac standpoint. - SHERLY requires no further cardiac testing prior to procedure and may proceed from cardiac standpoint. - No need for ASA; no cardiac need for antibiotics    Jitendra Doherty MD  Dear Roselyn,   I had the pleasure of seeing your patient SHERLY BROOKS for Cardiometabolic evaluation.   As you know, she  is a Pleasant, 59 year old with a past medical history of Obesity/Hyperlipidemia/Diabetes, HTN =============== =============== Obesity/Hyperlipidemia - Discussed diet and exercise at length - Increase Atorva to 40 (OK with HAART DOVATO medicine) - Likely start  - Contnue Metformin - Add GLP1RA  HTN -  Discussed reducing salt - Continue Chlorthalidone 25  Mn Systolic Murmur II.VI -TTE - check one now in next month today 5-2024   ----------------------------  CC: Heart Issues - Notes No CP/SOB/Palps/++Leg swelling - Reports No F/C/N/V/Headaches - Reports Normal Exercise Tolerance - Reports no medication changes - Reports normal mood/quality of life - Reports no diet changes - Reports no body aches - Reports no recent colds/viruses - Recent labs/imaging reviewed - Relevant Family history reviewed Mother/Father - CV In acute dCHF Check TTE RTC 1 wk TTE --1-2025 - LVEF  Nl  Chlorthalidone; Mounjaro

## 2025-07-07 NOTE — DISCUSSION/SUMMARY
[FreeTextEntry1] : In summary    Pleasant, 59 year old with a past medical history of Obesity/Hyperlipidemia/Diabetes, HTN =============== =============== Obesity/Hyperlipidemia - Discussed diet and exercise at length - Increase Atorva to 40 (OK with HAART DOVATO medicine) - Contnue Metformin - Continue GLP1RA on next visit   HTN -  Discussed reducing salt In acute dCHF Check TTE Stop Chlorthalidone  Lasix 40 daily RTC 1 wk    Mn Systolic Murmur II.VI -TTE - check one now in next month today 5-2024    As always, kind thanks for the referral.   Jitendra Doherty MD [facility] ML IVEY Director, Preventive Cardiology & Lipidology [facility] Cardiovascular [place]                                                                                                                                                                                                                                                                                                                                                                                                                                                                                                                                                                                                                                                                                                                           40 minutes spent in patient encounter explaining and formulating rationale for treatment plan. >50% of time spent in direct counseling reviewing all tests, labs, and imaging and conferring with patient, family member, and other physicians regarding patient care >50% of time spent in direct counseling reviewing all tests, labs, and imaging and conferring with patient, family member, and other physicians regarding patient care                                 [EKG obtained to assist in diagnosis and management of assessed problem(s)] : EKG obtained to assist in diagnosis and management of assessed problem(s)

## 2025-07-07 NOTE — PHYSICAL EXAM

## 2025-07-07 NOTE — DISCUSSION/SUMMARY
[FreeTextEntry1] : In summary    Pleasant, 59 year old with a past medical history of Obesity/Hyperlipidemia/Diabetes, HTN =============== =============== Obesity/Hyperlipidemia - Discussed diet and exercise at length - Increase Atorva to 40 (OK with HAART DOVATO medicine) - Contnue Metformin - Continue GLP1RA on next visit   HTN -  Discussed reducing salt In acute dCHF Check TTE Stop Chlorthalidone  Lasix 40 daily RTC 1 wk    Mn Systolic Murmur II.VI -TTE - check one now in next month today 5-2024    As always, kind thanks for the referral.   Jitendra Doherty MD [facility] ML IVEY Director, Preventive Cardiology & Lipidology [facility] Cardiovascular [place]                                                                                                                                                                                                                                                                                                                                                                                                                                                                                                                                                                                                                                                                                                                           40 minutes spent in patient encounter explaining and formulating rationale for treatment plan. >50% of time spent in direct counseling reviewing all tests, labs, and imaging and conferring with patient, family member, and other physicians regarding patient care >50% of time spent in direct counseling reviewing all tests, labs, and imaging and conferring with patient, family member, and other physicians regarding patient care                                 [EKG obtained to assist in diagnosis and management of assessed problem(s)] : EKG obtained to assist in diagnosis and management of assessed problem(s)